# Patient Record
Sex: FEMALE | Race: BLACK OR AFRICAN AMERICAN | NOT HISPANIC OR LATINO | Employment: FULL TIME | URBAN - METROPOLITAN AREA
[De-identification: names, ages, dates, MRNs, and addresses within clinical notes are randomized per-mention and may not be internally consistent; named-entity substitution may affect disease eponyms.]

---

## 2021-11-01 ENCOUNTER — ACNE/ROSACEA (OUTPATIENT)
Dept: URBAN - METROPOLITAN AREA CLINIC 32 | Facility: CLINIC | Age: 46
Setting detail: DERMATOLOGY
End: 2021-11-01

## 2021-11-01 DIAGNOSIS — L57.0 ACTINIC KERATOSIS: ICD-10-CM

## 2021-11-01 PROCEDURE — 99204 OFFICE O/P NEW MOD 45 MIN: CPT

## 2021-11-01 RX ORDER — DAPSONE 75 MG/G
1 A SMALL AMOUNT GEL TOPICAL EVERY MORNING
Qty: 60 | Refills: 0
Start: 2021-11-01

## 2021-11-01 RX ORDER — TAZAROTENE 0.45 MG/G
1 A SMALL AMOUNT LOTION TOPICAL EVERY OTHER DAY
Qty: 45 | Refills: 0
Start: 2021-11-01

## 2022-01-04 ENCOUNTER — RX ONLY (RX ONLY)
Age: 47
End: 2022-01-04

## 2022-01-04 ENCOUNTER — ACNE/ROSACEA (OUTPATIENT)
Dept: URBAN - METROPOLITAN AREA CLINIC 32 | Facility: CLINIC | Age: 47
Setting detail: DERMATOLOGY
End: 2022-01-04

## 2022-01-04 DIAGNOSIS — L80 VITILIGO: ICD-10-CM

## 2022-01-04 PROCEDURE — 11900 INJECT SKIN LESIONS </W 7: CPT

## 2022-01-04 PROCEDURE — 99214 OFFICE O/P EST MOD 30 MIN: CPT

## 2024-02-16 ENCOUNTER — APPOINTMENT (RX ONLY)
Dept: URBAN - METROPOLITAN AREA CLINIC 159 | Facility: CLINIC | Age: 49
Setting detail: DERMATOLOGY
End: 2024-02-16

## 2024-02-16 DIAGNOSIS — B00.1 HERPESVIRAL VESICULAR DERMATITIS: ICD-10-CM

## 2024-02-16 DIAGNOSIS — L66.81 CENTRAL CENTRIFUGAL CICATRICIAL ALOPECIA: ICD-10-CM

## 2024-02-16 DIAGNOSIS — L60.8 OTHER NAIL DISORDERS: ICD-10-CM

## 2024-02-16 PROBLEM — L66.8 OTHER CICATRICIAL ALOPECIA: Status: ACTIVE | Noted: 2024-02-16

## 2024-02-16 PROCEDURE — ? COUNSELING

## 2024-02-16 PROCEDURE — 99203 OFFICE O/P NEW LOW 30 MIN: CPT

## 2024-02-16 PROCEDURE — ? OBSERVATION AND MEASURE

## 2024-02-16 PROCEDURE — ? PRESCRIPTION

## 2024-02-16 RX ORDER — VALACYCLOVIR 500 MG/1
TABLET, FILM COATED ORAL
Qty: 30 | Refills: 4 | Status: ERX | COMMUNITY
Start: 2024-02-16

## 2024-02-16 RX ADMIN — VALACYCLOVIR: 500 TABLET, FILM COATED ORAL at 00:00

## 2024-02-16 ASSESSMENT — LOCATION DETAILED DESCRIPTION DERM
LOCATION DETAILED: LEFT THUMBNAIL
LOCATION DETAILED: LEFT SUPERIOR PARIETAL SCALP

## 2024-02-16 ASSESSMENT — LOCATION SIMPLE DESCRIPTION DERM
LOCATION SIMPLE: SCALP
LOCATION SIMPLE: LEFT THUMBNAIL

## 2024-02-16 ASSESSMENT — LOCATION ZONE DERM
LOCATION ZONE: SCALP
LOCATION ZONE: FINGERNAIL

## 2024-02-16 NOTE — PROCEDURE: COUNSELING
Patient Specific Counseling (Will Not Stick From Patient To Patient): =======2/16/2024\\nValtrex 500mg PO QD
Detail Level: Detailed
Patient Specific Counseling (Will Not Stick From Patient To Patient): ======2/16/2024\\nRecommend OTC minoxidil ( rogaine) \\nIf patient notices new spots or more Hairloss in the area to follow
Detail Level: Simple

## 2024-02-16 NOTE — HPI: OTHER
Condition:: Spot of concern
Please Describe Your Condition:: Black streak on the Left thumb nail, present for a couple weeks. Patient states she does get a painful sensation at times. Patient wants to make sure it is okay.
Condition:: Evaluation of skin lesions
Please Describe Your Condition:: Lesions that come and go on the buttocks. Patient states they tend to get itchy sometimes. Patient states it tends to flare in different areas. Patient states when she sweats that tends to make it more itchy.